# Patient Record
Sex: MALE | Race: WHITE | NOT HISPANIC OR LATINO | URBAN - METROPOLITAN AREA
[De-identification: names, ages, dates, MRNs, and addresses within clinical notes are randomized per-mention and may not be internally consistent; named-entity substitution may affect disease eponyms.]

---

## 2017-01-03 ENCOUNTER — MEDICAL CORRESPONDENCE (OUTPATIENT)
Dept: TRANSPLANT | Facility: CLINIC | Age: 72
End: 2017-01-03

## 2017-01-19 ENCOUNTER — DOCUMENTATION ONLY (OUTPATIENT)
Dept: TRANSPLANT | Facility: CLINIC | Age: 72
End: 2017-01-19

## 2017-01-23 NOTE — PROGRESS NOTES
Zeeshan Wang did not show to the Class, nutrition or Dr. Cristinao neal on 1/19/2017.  I sent message to Dionne 1/20/2017 in Epic asking her to call pt to see what happened if he knew about the appts and if he wants to reschedule.  Winnie can you check with patient please?

## 2017-02-02 ENCOUNTER — TELEPHONE (OUTPATIENT)
Dept: TRANSPLANT | Facility: CLINIC | Age: 72
End: 2017-02-02

## 2017-02-02 NOTE — Clinical Note
"Pan Truong,  Please make class, surgeon to check weight BMI 43 and Faiza dietitian appt no other labs; \"mini Evaluation\".   Use encounter of 2/02/2017 to attach your schedule.  Pt is very willing to learn about transplants.   First encounter accidentally was closed.  I talked directly with Zeeshan, he reported NOT getting the evaluation schedule and is willing reschedule mini evaluation.   I talked directly with Zeeshan, he reported NOT getting the evaluation schedule for Janurary 19, 2017.  He questions if there are person going through his mailbox.  He did receive the second  and filled the application and mailed to us.  He wants to reschedule on Thursday for mini eval.  I connected him with Dionne. I will send Dionne a note.  "

## 2017-02-02 NOTE — TELEPHONE ENCOUNTER
First encounter accidentally was closed.  I talked directly with Zeeshan, he reported NOT getting the evaluation schedule and is willing reschedule mini evaluation.   I talked directly with Zeeshan, he reported NOT getting the evaluation schedule for Janurary 19, 2017.  He questions if there are person going through his mailbox.  He did receive the second  and filled the application and mailed to us.  He wants to reschedule on Thursday for mini eval.  I connected him with Jose. I will send Jose a note.  Jose is out of office. Colleague Winnie sent schedule to Mr. Wang via [employer] to arrive Monday 2/13.

## 2017-02-06 ENCOUNTER — MEDICAL CORRESPONDENCE (OUTPATIENT)
Dept: TRANSPLANT | Facility: CLINIC | Age: 72
End: 2017-02-06

## 2017-02-16 ENCOUNTER — OFFICE VISIT (OUTPATIENT)
Dept: TRANSPLANT | Facility: CLINIC | Age: 72
End: 2017-02-16
Attending: SURGERY
Payer: COMMERCIAL

## 2017-02-16 ENCOUNTER — APPOINTMENT (OUTPATIENT)
Dept: TRANSPLANT | Facility: CLINIC | Age: 72
End: 2017-02-16
Attending: PHYSICIAN ASSISTANT
Payer: COMMERCIAL

## 2017-02-16 ENCOUNTER — ALLIED HEALTH/NURSE VISIT (OUTPATIENT)
Dept: TRANSPLANT | Facility: CLINIC | Age: 72
End: 2017-02-16
Attending: DIETITIAN, REGISTERED
Payer: COMMERCIAL

## 2017-02-16 ENCOUNTER — DOCUMENTATION ONLY (OUTPATIENT)
Dept: TRANSPLANT | Facility: CLINIC | Age: 72
End: 2017-02-16

## 2017-02-16 VITALS
HEART RATE: 62 BPM | TEMPERATURE: 98.5 F | SYSTOLIC BLOOD PRESSURE: 118 MMHG | WEIGHT: 305 LBS | BODY MASS INDEX: 45.18 KG/M2 | DIASTOLIC BLOOD PRESSURE: 74 MMHG | HEIGHT: 69 IN | OXYGEN SATURATION: 100 %

## 2017-02-16 DIAGNOSIS — N18.6 END STAGE RENAL DISEASE (H): Primary | ICD-10-CM

## 2017-02-16 DIAGNOSIS — Z76.82 ORGAN TRANSPLANT CANDIDATE: Primary | ICD-10-CM

## 2017-02-16 DIAGNOSIS — Z76.82 ORGAN TRANSPLANT CANDIDATE: ICD-10-CM

## 2017-02-16 ASSESSMENT — PAIN SCALES - GENERAL: PAINLEVEL: MILD PAIN (2)

## 2017-02-16 NOTE — MR AVS SNAPSHOT
"              After Visit Summary   2/16/2017    Zeeshan Wang    MRN: [id number]           Patient Information     Date Of Birth          1945        Visit Information        Provider Department      2/16/2017 11:00 AM Devika Zhou RD [facility] Solid Organ Transplant        Today's Diagnoses     Organ transplant candidate    -  1      Care Instructions    - BMI for kidney transplant goal of 35 or 237 pounds   - 3 meals a day (no skipping meals)  - Eggs in the morning or a protein bar, less cereal or starchy foods (toast, bread, potatoes, cereal, etc)  - [employer] grocery store for meat (chicken, beef, pork, fish, shrimp okay)  - Meat or protein at every meal (eat this first) and vegetables (green beans, carrots, broccoli)        Follow-ups after your visit        Who to contact     If you have questions or need follow up information about today's clinic visit or your schedule please contact [facility] SOLID ORGAN TRANSPLANT directly at [phone].  Normal or non-critical lab and imaging results will be communicated to you by [employer], letter or phone within 4 business days after the clinic has received the results. If you do not hear from us within 7 days, please contact the clinic through "[employer] or phone. If you have a critical or abnormal lab result, we will notify you by phone as soon as possible.  Submit refill requests through "[employer] or call your pharmacy and they will forward the refill request to us. Please allow 3 business days for your refill to be completed.          Additional Information About Your Visit        "[employer] Information     "[employer] lets you send messages to your doctor, view your test results, renew your prescriptions, schedule appointments and more. To sign up, go to [email or web address] . Click on \"Log in\" on the left side of the screen, which will take you to the Welcome page. Then click on \"Sign up Now\" on the right side of the page.     You will be asked to enter the " access code listed below, as well as some personal information. Please follow the directions to create your username and password.     Your access code is: [email or web address]  Expires: 2017  6:30 AM     Your access code will  in 90 days. If you need help or a new code, please call your [facility] or [phone].        Care EveryWhere ID     This is your Care EveryWhere ID. This could be used by other organizations to access your [place] medical records  [id number]         Blood Pressure from Last 3 Encounters:   17 118/74    Weight from Last 3 Encounters:   17 (!) 138.3 kg (305 lb)              Today, you had the following     No orders found for display       Primary Care Provider Office Phone # Fax #    Tyree Guardado [phone]       [facility] [address]  [place] [address]        Thank you!     Thank you for choosing [facility] SOLID ORGAN TRANSPLANT  for your care. Our goal is always to provide you with excellent care. Hearing back from our patients is one way we can continue to improve our services. Please take a few minutes to complete the written survey that you may receive in the mail after your visit with us. Thank you!             Your Updated Medication List - Protect others around you: Learn how to safely use, store and throw away your medicines at www.disposemymeds.org.      Notice  As of 2017  3:05 PM    You have not been prescribed any medications.

## 2017-02-16 NOTE — PROGRESS NOTES
"NUTRITION KIDNEY TRANSPLANT EVALUATION  Medical Nutrition Therapy    Weight and BMI:  Current BMI: 45  BMI is outside criteria for kidney transplant  BMI Goal: 35  Current Weight: 305 lbs  Goal Weight: 237 lbs  Weight Loss Needed: 68 lbs    Malnutrition Status:    Patient does not meet two of the criteria necessary for diagnosing malnutrition        Visit Type: Initial Assessment    Zeeshan Wang referred by Dr. Cagle for MNT related to kidney transplant evaluation    Patient accompanied by self    H/o previous txp: none    Nutrition Assessment:  Anthropometrics  Height:   69 in   BMI:    45  Weight Status:Obesity Grade III BMI >40   Weight:  305 lbs            IBW (lb): 160  % IBW: 191    Wt Hx: Pt's highest wt was 352 lbs 10 years ago. He had a gastric bypass (unsure what type) 10 years ago and lowest wt reached of 291 lbs. He reports he \"can't lose weight - I don't know how I will lose nearly 70 lbs as I've tried to make changes without success below 290 lbs\".     Adj/dosing BW: 196 lbs/89 kg     Goal wt prior to txp: 237 lbs (BMI <35 or per MD)       No results for input(s): CHOL, HDL, LDL, TRIG, CHOLHDLRATIO in the last 02188 hours.  No results found for: A1C  No results found for: POTASSIUM    Vitamins, Supplements, Herbals, Pertinent Meds:   None     Dialysis Modality: HD  Days/Times: MWF 3p-8p  Dialysis Start: 11/2016  Pt receives protein supplement with dialysis: Y    Nutrition History  Pt-reported special diets/eating habits: low Na+   Dining out/food not made at home: [employer] 1x/week  Pt eats 2-3 meals/day. His eating schedule is somewhat \"off\" or inconsistent after dialysis as he is really sleepy.     Recall:  B: waffles with eggs and sausage or cereal or Efrain Nagi home made breakfast bowl (veggies, potatoes, sausage, eggs all cooked with butter)  L: salad (lettuce, cucumbers, peas, carrots, 1/4 bottle Urdu dressing) or burger with onions and BBQ on a bun   D: salad (same as L) or " chix/fish/pork/steak (6 oz); minimal pasta/rice; PB s/w 2x/week  Sn: protein bars 3x/week (unsure of brand and protein content), grapes 2x/week, raw veggie tray 1x/week   Beverages: water, small glass of apple juice, rare milk   ETOH (1 drink = 12 oz beer, 5 oz wine, 1.5 oz liquor): none     Current adherence to recommended diet (low Na+): Fair    Physical Activity  Limited d/t back issues. Can walk ~80 feet before he needs to stop. He does have access to a gym membership/facility, but needs to pay to use various pieces of equipment, which pt cannot do.  He can use the walking track for free, but cannot physically do this d/t need for frequent stopping and no places to rest on the track.     MALNUTRITION  % Intake:  No decreased intake noted  % Weight Loss:  Weight loss does not meet criteria for malnutrition - significant weight loss ~10 years ago   Subcutaneous Fat Loss:  None  Muscle Loss:  None  Fluid Accumulation/Edema:  Unknown   Malnutrition Diagnosis: Patient does not meet two of the criteria necessary for diagnosing malnutrition     Nutrition Prescription  Energy:  1780     (20 kcal/kg Adj/dosing BW for desired wt loss)       Protein:  107-125    (1.2-1.4 g/kg Adj/dosing BW for HD needs)           Fluid:  1 ml/kcal or per MD        Micronutrient:  Na+: <2000 mg/day  K+: 9135-9194 mg/day  Phos: 800-1000 mg/day         Nutrition Diagnosis:  Obesity r/t excessive energy intake and inadequate physical activity AEB BMI >30.    Nutrition Intervention:  Nutrition education provided:  Discussed strategies for wt loss (see pt instructions for more details - provided this with pt; pt agrees these are realistic goals/changes he can make). Stressed importance of protein for both dialysis and weight loss. If he eats needed amount of protein (minimum of 107 g protein/day), he could likely eat protein all day and still not meet his protein goal. Explained what one of his lunches or dinners should ideally look like: lean  protein and non-starchy veggies. Discussed different BF ideas and ways to cook eggs (boiled, scrambled with veggies, egg muffin cups). Include a protein bar b/t meals if still hungry. Pt acknowledges portion sizes are very large and that this has always been an issue for him. He is unsure how to address this. Discussed cooking in large batches (cook enough chicken for a few meals, etc) so he doesn't have to cook every day.   Discussed salad dressing quantity and that he has the right idea to include more veggies, but consider a different form (frozen steamed veggies) and to include protein on a salad with less dressing (measure dressing) to fit his needs better. Emphasized several times to focus on protein first at meal times, to include veggies at least daily or 2x/day, and to reduce or avoid starchy/carb-rich foods. Encouraged pt make 1 change at a time (make one meal/day chicken and green beans, for example, or focus on high protein BF first, etc). Do not make drastic changes, as this will be hard to maintain.   Provided pt with seasoning food w/o salt handout, low and high K+ food items to help guide his choice in veggies, and tips for low sodium diet. Pt reports this information is helpful. Also sent pt recipes via mail.   Did not review post txp diet guidelines at this time.   Patient Understanding: Pt verbalized understanding of education provided.  Expected Compliance: Good  Follow-Up Plans: PRN     Nutrition Goals:  1. Weight loss to goal wt: < 237 lbs (BMI <35 or per MD)  2. Focus on protein first + non-starchy low K+ veggies     Provided pt with contact info.   Time spent with patient: 60 minutes.  Devika Zhou, RD, LD

## 2017-02-16 NOTE — MR AVS SNAPSHOT
"              After Visit Summary   2017    Zeeshan Wang    MRN: [id number]           Patient Information     Date Of Birth          1945        Visit Information        Provider Department      2017 9:30 AM Eitan Quinonez MD [facility] Solid Organ Transplant        Today's Diagnoses     End stage renal disease (H)    -  1    Organ transplant candidate           Follow-ups after your visit        Who to contact     If you have questions or need follow up information about today's clinic visit or your schedule please contact [facility] SOLID ORGAN TRANSPLANT directly at [phone].  Normal or non-critical lab and imaging results will be communicated to you by [employer], letter or phone within 4 business days after the clinic has received the results. If you do not hear from us within 7 days, please contact the clinic through [employer] or phone. If you have a critical or abnormal lab result, we will notify you by phone as soon as possible.  Submit refill requests through [employer] or call your pharmacy and they will forward the refill request to us. Please allow 3 business days for your refill to be completed.          Additional Information About Your Visit        MyChart Information     [employer] lets you send messages to your doctor, view your test results, renew your prescriptions, schedule appointments and more. To sign up, go to [email or web address] . Click on \"Log in\" on the left side of the screen, which will take you to the Welcome page. Then click on \"Sign up Now\" on the right side of the page.     You will be asked to enter the access code listed below, as well as some personal information. Please follow the directions to create your username and password.     Your access code is: [email or web address]  Expires: 2017  6:30 AM     Your access code will  in 90 days. If you need help or a new code, please call your [place] clinic or [phone].        Care EveryWhere ID     This is your Care " "EveryWhere ID. This could be used by other organizations to access your [place] medical records  [id number]        Your Vitals Were     Pulse Temperature Height Pulse Oximetry BMI (Body Mass Index)       62 98.5  F (36.9  C) (Oral) 1.753 m (5' 9\") 100% 45.04 kg/m2        Blood Pressure from Last 3 Encounters:   02/16/17 118/74    Weight from Last 3 Encounters:   02/16/17 (!) 138.3 kg (305 lb)              Today, you had the following     No orders found for display       Primary Care Provider Office Phone # Fax #    Tyree SZYMANSKI Geo [phone]       [facility] [address]  [place] [address]        Thank you!     Thank you for choosing [facility] SOLID ORGAN TRANSPLANT  for your care. Our goal is always to provide you with excellent care. Hearing back from our patients is one way we can continue to improve our services. Please take a few minutes to complete the written survey that you may receive in the mail after your visit with us. Thank you!             Your Updated Medication List - Protect others around you: Learn how to safely use, store and throw away your medicines at www.disposemymeds.org.      Notice  As of 2/16/2017 11:20 AM    You have not been prescribed any medications.      "

## 2017-02-16 NOTE — LETTER
2/16/2017       RE: Zeeshan Wang  [address]  [place] [address]     Dear Colleague,    Thank you for referring your patient, Zeeshan Wang, to the [facility] SOLID ORGAN TRANSPLANT at [facility]. Please see a copy of my visit note below.    CHIEF COMPLAINT: Chronic kidney disease - transplant evaluation. Mr. Wang is a 71 year old male with chronic kidney disease stage 5 and is on chronic hemodialysis therapy at [facility].    He was sent to us for evaluation by Dr. Ghanshyam Castellano.      Mr. Wang has diabetic nephropathy, coronary artery disease with previous stenting, skin cancer, a prior history of melanoma that is not confirmed in our records, and morbid obesity with a previous gastric bypass.  He is currently extremely deconditioned and is mostly sedentary.  His current weight is 305 pounds and his BMI is 45.  His probable risk for transplantation is excessive and he is not an acceptable candidate.    My role today also was to discuss the risks and benefits of transplantation with the patient.  Topics discussed included, but were not limited to:  1.    Kidney transplant selection process.   2.   The types of donors: brain death donors, non-heart beating donors.   3.   The kidney transplant operation and the associated risks and technical complications which can include intraoperative death, post operative death. Some of these complications may require a second operation.  4. The postoperative course and risk of postoperative complications which can include sepsis, MI, stroke, brain injury, pneumonia, pleural effusion.  5. The current 1 year and 5 year graft and patient survivals.  6.   The need for life long immunosuppressive therapy and the side effects of these medications.  7. The need for compliance with medications and follow-up visits in the clinic and thereafter.  8.  The patient understands these risks and understands that his risks are  excessive and that he is not currently a transplant candidate.     Mr Felipe is not a candidate for renal transplant given his obesity, deconditioning, previous cancer  (he does not know the details of what kind of cancer he had), and calcific coronary artery disease.  Should he lose 65 pounds, he could be reevaluated with more specific testing of the severity of his coronary disease and peripheral vascular disease and details of his previous cancer treatment could be accumulated, but he is not likely to lose the weight, as he points out.   I spent all of the time discussing this conclusion with him and he understands the rationale for not pursuing transplant as a treatment option. All of his questions were answered this morning.  Total time spent: 50 minutes.   Counseling time: 50 minutes.     Again, thank you for allowing me to participate in the care of your patient.      Sincerely,    Eitan Quinonez MD

## 2017-02-16 NOTE — PATIENT INSTRUCTIONS
- BMI for kidney transplant goal of 35 or 237 pounds   - 3 meals a day (no skipping meals)  - Eggs in the morning or a protein bar, less cereal or starchy foods (toast, bread, potatoes, cereal, etc)  - [facility] (chicken, beef, pork, fish, shrimp okay)  - Meat or protein at every meal (eat this first) and vegetables (green beans, carrots, broccoli)

## 2017-02-16 NOTE — PROGRESS NOTES
Zeeshan attended the Pre-Kidney Transplant Education class today alone.  He was in a wheelchair and needed assistance of the clinic staff to get from one spot to another.  He states he is in the wheelchair because he has 7 bad vertebrae.  Zeeshan was very attentive during class and asked many questions.  Many of his questions were about kidney failure in general, how dialysis works, fluid weight versus solid weight, why he still has urine output if his kidneys don't work, etc.  It seems evident he has some knowledge deficit about his kidney failure.  He also had a number of appropriate questions about transplant and seems very interested in learning the details.  I introduced the [facility] website and encouraged him to access it for more education.  In addition to the standard video content I reviewed living donation, paired exchange, KDPI, typical length of stay and the need to stay locally post-transplant discharge.  At the end of class he indicated understanding of the information presented.  I provided him with Bernice's business card as she requested.

## 2017-02-22 ENCOUNTER — COMMITTEE REVIEW (OUTPATIENT)
Dept: TRANSPLANT | Facility: CLINIC | Age: 72
End: 2017-02-22

## 2017-02-22 NOTE — LETTER
February 22, 2017    Zeeshan Langnt  [address]  [place] [address]      Dear Mr. Wang,   The purpose of this required letter is to inform you on February 22, 2017 the [facility] Multi-Disciplinary Selection Team reviewed your medical history with the selection criteria used by our program, have determined you are not a kidney candidate.  You are best served by continuing dialysis. Your name will not be added to the kidney Wait List.    We recommend you continue to follow up with your primary care doctor in order to manage your health.  [employer] ([employer]) requires form letter which describes the services offered to patients by [employer] and the Organ Procurement and Transplantation Network.  Thank you, Mr. Wang for meeting with Eitan Quinonez MD and our dietitian on February 16, 2017.  If you or your physicians have questions about this letter you may call [phone] option 5.   We wish you well.  Sincerely,  Kidney Transplant Team Respectfully submitted, by Bernice Adkins RN BSN  Enclosure:  [employer] Letter  CC: Ghanshyam Castellano MD; Tyree Guardado MD; [facility] dialysis unit

## 2017-02-22 NOTE — COMMITTEE REVIEW
"Abdominal Committee Review Note     Evaluation Date:   Committee Review Date: 2/22/2017    Organ being evaluated for: Kidney    Transplant Phase: Referral  Transplant Status: Declined    Transplant Coordinator: Bernice Adkins  Transplant Surgeon:       Referring Physician: Ghanshyam Castellano    Primary Diagnosis:   Secondary Diagnosis:     Committee Review Members:  Nephrology Fidel Goodwin MD   Nurse Practitioner - [facility] BO Charles, APRN CNP, Winnie Wilson, NP   Nutrition Devika Zhou, EMMA   Physician Assistant - Medical Paola Cazares PA-C    - Clinical Crystla Ritchie, [facility], Cassandra Santos, [facility]   Transplant Malcom Barnes, RN, Belinda Blancas, RN, Kinza Vides LPN, Nerissa Suazo RN, Bernice Adkins, RN, Ilene Phillips, EL, Ilene Davidson, RN, Radha Hill MD, Trey Avila MD, Thalia Elias RN       Transplant Eligibility: Irreversible chronic kidney disease treated w/dialysis or expected need for dialysis    Committee Review Decision: Declined    Relative Contraindications: Other, multiple medical conditions, Increased risk     Absolute Contraindications: Other, CAD, Obesity, Gastric bypass, Diabetes, previous mention of skin cancer melanoma type    Committee Chair Radha Hill MD verbally attested to the committee's decision.    Committee Discussion Details:      Team read Dr. Quinonez's letter and determine he is not a kidney candidate.     \"Mr. Wang has diabetic nephropathy, coronary artery disease with previous stenting, skin cancer, a prior history of melanoma that is not confirmed in our records, and morbid obesity with a previous gastric bypass. He is currently extremely deconditioned and is mostly sedentary. His current weight is 305 pounds and his BMI is 45. His probable risk for transplantation is excessive and he is not an acceptable candidate.\"    I called Mr. Wang per our process to inform him a letter will be sent " to him, his pcp and nephrologist.    He standing understanding.

## 2018-11-02 ENCOUNTER — RECORDS - HEALTHEAST (OUTPATIENT)
Dept: ADMINISTRATIVE | Facility: OTHER | Age: 73
End: 2018-11-02

## 2018-11-08 ENCOUNTER — AMBULATORY - HEALTHEAST (OUTPATIENT)
Dept: NEUROLOGY | Facility: CLINIC | Age: 73
End: 2018-11-08

## 2018-11-08 DIAGNOSIS — G31.84 MCI (MILD COGNITIVE IMPAIRMENT): ICD-10-CM

## 2018-12-04 ENCOUNTER — HOSPITAL ENCOUNTER (OUTPATIENT)
Dept: NEUROLOGY | Facility: CLINIC | Age: 73
Setting detail: THERAPIES SERIES
Discharge: STILL ADMITTED OR ADMINISTRATIVE | End: 2018-12-04
Attending: PSYCHOLOGIST

## 2018-12-04 DIAGNOSIS — G31.84 MCI (MILD COGNITIVE IMPAIRMENT): ICD-10-CM

## 2018-12-04 DIAGNOSIS — F03.90 MAJOR NEUROCOGNITIVE DISORDER (H): ICD-10-CM

## 2018-12-04 DIAGNOSIS — F32.9 MAJOR DEPRESSIVE DISORDER WITH CURRENT ACTIVE EPISODE, UNSPECIFIED DEPRESSION EPISODE SEVERITY, UNSPECIFIED WHETHER RECURRENT: ICD-10-CM

## 2018-12-04 DIAGNOSIS — R41.0 DELIRIUM: ICD-10-CM

## 2019-01-14 ENCOUNTER — AMBULATORY - HEALTHEAST (OUTPATIENT)
Dept: NEUROLOGY | Facility: CLINIC | Age: 74
End: 2019-01-14

## 2021-06-22 NOTE — PROGRESS NOTES
The patient was seen for a neuropsychological evaluation for the purposes of diagnostic clarification and treatment planning. 90 minutes of face-to-face testing were provided by this writer. The patient was cooperative with testing. No concerns were brought to my attention. Please see Dr. Loco's report for a detailed description of the charges and interpretation and integration of the findings.

## 2021-06-22 NOTE — PROGRESS NOTES
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 use numbers to rapidly decode a series of abstract symbols.  His performance fell in the low average range on a task that required him to quickly connect a series of numbers presented in a visual array on a page. His performance was in the markedly impaired range on a subsequent task that required mental flexibility and set-shifting to quickly alternate between sequencing letters and numbers presented on a page. Specifically the task was discontinued at 5 minutes after Mr. Wang had made three errors and demonstrated no understanding of how to proceed with the task.    Visual attention and spatial localization skills were low average. Two dimensional visuoconstruction of a geometric design was notable for inattentive errors and his performance fell in the low average range relative to peers. As previously noted, he was unable to accurately draw a clock.    With regard to learning and memory, Mr. Wang was also administered measure of rote auditory verbal list learning that required him to learn a series of 10 words over 4 trials and retain and  recall them over a long (20 minute) delay.  His initial rate of learning fell in the severely impaired range of functioning (raw score recall over trials = 1, 2, 3, 5).  Mr. Wang retained and recalled approximately 0% of the previously learned information over the long delay (moderately to severely impaired performance).  Recognition memory was at chance levels (moderately to severely impaired).  Contextual auditory verbal learning abilities were moderately impaired and he retained and recalled 50% of the previously learned information over a delay (moderately impaired). Visual delayed memory was moderately impaired, with 19% retention over the delay.      Academically, his performance was borderline impaired on measures of single-word reading (equivalent to that of a 4th grade student) and simple mathematical calculations (equivalent to that of a 3rd grade student). Specifically, Mr. Wang was able to correctly execute one and two digit addition but became error prone on two digit subtraction.  Simple one digit multiplication and division were accurate, but his performance deteriorated thereafter. He was unable to add fractions, round digits to the nearest ten or complete 2 digit division.    On the GDS-15, a self report measure of depressive symptomatology, he obtained a score of 11, placing him in the range of moderate to severe depression.  Of note, he missed one item on the questionnaire.      SERVICES & TESTS ADMINISTERED:  Pertinent information was obtained by reviewing the electronic medical record as well as through an individual interview I conducted with the patient.  I selected, administered the WMS-III information and orientation, BNT-15, draw-a-clock, integrated and interpreted the tests, and generated this report.   The psychology intern administered the remainder of the cognitive tasks.  At the end of the session, I met with the patient and discussed the results, provided feedback and education,  and completed any pertinent documentation/ROIs. The test battery included WMS-III information and orientation, BNT-15, draw-a-clock,  Wechsler Adult Intelligence Scale-IV (select subtests), Wide Range Achievement Test-4 (select subtests), Trailmaking Test, FAS Verbal fluency, Repeatable Battery for the Assessment of Neuropsychological Status-Update, Geriatric Depression Scale-Short Form.  For diagnostic and coding purposes, Mr. Wang has a history of delirium and depression and was referred for an evaluation of major neurocognitive disorder. Today s evaluation consisted of 2 hours of 82195, 2 hours of 98309, and 4 hours of 67838.    Thank you for the opportunity to assist in the evaluation and care of Mr. Wang.    Please do not hesitate to contact me with any questions regarding my findings or recommendations.    Dianna Loco, PhD, LP, ABPP  Board Certified in Clinical Neuropsychology    [place] [address]  Phone: [phone]

## 2021-06-23 NOTE — PROGRESS NOTES
"TELEPHONE NOTE/CONSULTATION    Received a phone call today from [place] ([phone]) from adult protection regarding Mr. Wang.  I was informed that adult protection has been working with the patient on and off for the last year and have faced some challenges with getting services for Mr. Wang. Since November of 2018, he has apparently received services under the elderly waiver (e.g., medication machine, Life Alert, housekeeping, home health nursing, etc.). He is currently in agreement to transition to an assisted living facility, as [place] [facility] [place]. However, his adult protection worker is concerned that he may \"change his mind\" and later refuse to transition to that environment. They are in the process of consulting the  to determine whether they should pursue guardianship.  She asked me if I would be in support of guardianship for this patient. I indicated that in my recollection of the patient and his degree of confusion, I suspect that he is not capable of making his own medical decisions. I indicated that I would review the chart more thoroughly and consult our care management team and the hospital  to determine what documentation I could provide to [facility]/the .  [place] indicated that she would call again to follow-up, if needed.    Dianna Loco, PhD, LP, ABPP  Clinical Neuropsychologist, [id number]  Board Certified in Clinical Neuropsychology    [facility]  Neuropsychology Section   [phone]  "